# Patient Record
Sex: FEMALE | Race: BLACK OR AFRICAN AMERICAN
[De-identification: names, ages, dates, MRNs, and addresses within clinical notes are randomized per-mention and may not be internally consistent; named-entity substitution may affect disease eponyms.]

---

## 2020-01-21 ENCOUNTER — HOSPITAL ENCOUNTER (EMERGENCY)
Dept: HOSPITAL 54 - ER | Age: 35
Discharge: HOME | End: 2020-01-21
Payer: COMMERCIAL

## 2020-01-21 VITALS — SYSTOLIC BLOOD PRESSURE: 121 MMHG | DIASTOLIC BLOOD PRESSURE: 75 MMHG

## 2020-01-21 VITALS — HEIGHT: 63 IN | WEIGHT: 125 LBS | BODY MASS INDEX: 22.15 KG/M2

## 2020-01-21 DIAGNOSIS — E86.0: ICD-10-CM

## 2020-01-21 DIAGNOSIS — Z98.890: ICD-10-CM

## 2020-01-21 DIAGNOSIS — J10.1: Primary | ICD-10-CM

## 2020-01-21 PROCEDURE — 96374 THER/PROPH/DIAG INJ IV PUSH: CPT

## 2020-01-21 PROCEDURE — 87804 INFLUENZA ASSAY W/OPTIC: CPT

## 2020-01-21 PROCEDURE — 96361 HYDRATE IV INFUSION ADD-ON: CPT

## 2020-01-21 PROCEDURE — 99283 EMERGENCY DEPT VISIT LOW MDM: CPT

## 2020-01-21 RX ADMIN — SODIUM CHLORIDE ONE ML: 9 INJECTION, SOLUTION INTRAVENOUS at 21:36

## 2020-01-21 RX ADMIN — ACETAMINOPHEN ONE MG: 325 TABLET ORAL at 21:37

## 2020-01-21 RX ADMIN — KETOROLAC TROMETHAMINE ONE MG: 30 INJECTION, SOLUTION INTRAMUSCULAR at 21:38

## 2020-01-21 NOTE — NUR
C/O GENERALIZED BODY PAIN, HEADACHE SINCE THIS AFTERNOON. "I THINK I HAVE THE 
FLU". PT APPEARS VERY ANXIOUS, HYPERVENTILATING, CRYING. PT AWAKE, ALERT. -SOB, 
NAD NOTED, VSS, PENDING MD [facility]